# Patient Record
Sex: MALE | Race: WHITE | HISPANIC OR LATINO | Employment: FULL TIME | URBAN - METROPOLITAN AREA
[De-identification: names, ages, dates, MRNs, and addresses within clinical notes are randomized per-mention and may not be internally consistent; named-entity substitution may affect disease eponyms.]

---

## 2020-11-07 ENCOUNTER — HOSPITAL ENCOUNTER (EMERGENCY)
Facility: HOSPITAL | Age: 35
Discharge: HOME | End: 2020-11-07
Attending: EMERGENCY MEDICINE
Payer: OTHER GOVERNMENT

## 2020-11-07 VITALS
TEMPERATURE: 98 F | SYSTOLIC BLOOD PRESSURE: 146 MMHG | HEIGHT: 64 IN | DIASTOLIC BLOOD PRESSURE: 76 MMHG | WEIGHT: 170 LBS | HEART RATE: 103 BPM | RESPIRATION RATE: 20 BRPM | BODY MASS INDEX: 29.02 KG/M2 | OXYGEN SATURATION: 94 %

## 2020-11-07 DIAGNOSIS — R20.0 NUMBNESS: ICD-10-CM

## 2020-11-07 DIAGNOSIS — G62.9 NEUROPATHY: Primary | ICD-10-CM

## 2020-11-07 PROBLEM — R53.1 RIGHT SIDED WEAKNESS: Status: ACTIVE | Noted: 2020-11-07

## 2020-11-07 LAB
ALBUMIN SERPL BCP-MCNC: 4.1 G/DL (ref 3.5–5.2)
ALP SERPL-CCNC: 83 U/L (ref 55–135)
ALT SERPL W/O P-5'-P-CCNC: 262 U/L (ref 10–44)
AMPHET+METHAMPHET UR QL: NEGATIVE
ANION GAP SERPL CALC-SCNC: 15 MMOL/L (ref 8–16)
AST SERPL-CCNC: 482 U/L (ref 10–40)
BARBITURATES UR QL SCN>200 NG/ML: NEGATIVE
BASOPHILS # BLD AUTO: 0.03 K/UL (ref 0–0.2)
BASOPHILS NFR BLD: 0.2 % (ref 0–1.9)
BENZODIAZ UR QL SCN>200 NG/ML: NEGATIVE
BILIRUB SERPL-MCNC: 0.5 MG/DL (ref 0.1–1)
BUN SERPL-MCNC: 24 MG/DL (ref 6–20)
BUN SERPL-MCNC: 26 MG/DL (ref 6–30)
BZE UR QL SCN: NEGATIVE
CALCIUM SERPL-MCNC: 9.5 MG/DL (ref 8.7–10.5)
CANNABINOIDS UR QL SCN: NEGATIVE
CHLORIDE SERPL-SCNC: 100 MMOL/L (ref 95–110)
CHLORIDE SERPL-SCNC: 101 MMOL/L (ref 95–110)
CHOLEST SERPL-MCNC: 185 MG/DL (ref 120–199)
CHOLEST/HDLC SERPL: 4 {RATIO} (ref 2–5)
CO2 SERPL-SCNC: 21 MMOL/L (ref 23–29)
CREAT SERPL-MCNC: 1.5 MG/DL (ref 0.5–1.4)
CREAT SERPL-MCNC: 1.6 MG/DL (ref 0.5–1.4)
CREAT UR-MCNC: 38 MG/DL (ref 23–375)
CTP QC/QA: YES
DIFFERENTIAL METHOD: ABNORMAL
EOSINOPHIL # BLD AUTO: 0 K/UL (ref 0–0.5)
EOSINOPHIL NFR BLD: 0 % (ref 0–8)
ERYTHROCYTE [DISTWIDTH] IN BLOOD BY AUTOMATED COUNT: 13 % (ref 11.5–14.5)
EST. GFR  (AFRICAN AMERICAN): >60 ML/MIN/1.73 M^2
EST. GFR  (NON AFRICAN AMERICAN): 54.2 ML/MIN/1.73 M^2
GLUCOSE SERPL-MCNC: 117 MG/DL (ref 70–110)
GLUCOSE SERPL-MCNC: 158 MG/DL (ref 70–110)
HCT VFR BLD AUTO: 42.3 % (ref 40–54)
HCT VFR BLD CALC: 45 %PCV (ref 36–54)
HDLC SERPL-MCNC: 46 MG/DL (ref 40–75)
HDLC SERPL: 24.9 % (ref 20–50)
HGB BLD-MCNC: 14.1 G/DL (ref 14–18)
IMM GRANULOCYTES # BLD AUTO: 0.18 K/UL (ref 0–0.04)
IMM GRANULOCYTES NFR BLD AUTO: 1 % (ref 0–0.5)
INR PPP: 1 (ref 0.8–1.2)
LDLC SERPL CALC-MCNC: 127.2 MG/DL (ref 63–159)
LYMPHOCYTES # BLD AUTO: 0.8 K/UL (ref 1–4.8)
LYMPHOCYTES NFR BLD: 4.1 % (ref 18–48)
MCH RBC QN AUTO: 29 PG (ref 27–31)
MCHC RBC AUTO-ENTMCNC: 33.3 G/DL (ref 32–36)
MCV RBC AUTO: 87 FL (ref 82–98)
METHADONE UR QL SCN>300 NG/ML: NEGATIVE
MONOCYTES # BLD AUTO: 1 K/UL (ref 0.3–1)
MONOCYTES NFR BLD: 5.4 % (ref 4–15)
NEUTROPHILS # BLD AUTO: 16.8 K/UL (ref 1.8–7.7)
NEUTROPHILS NFR BLD: 89.3 % (ref 38–73)
NONHDLC SERPL-MCNC: 139 MG/DL
NRBC BLD-RTO: 0 /100 WBC
OPIATES UR QL SCN: NEGATIVE
PCP UR QL SCN>25 NG/ML: NEGATIVE
PLATELET # BLD AUTO: 222 K/UL (ref 150–350)
PLATELET BLD QL SMEAR: ABNORMAL
PMV BLD AUTO: 12 FL (ref 9.2–12.9)
POC IONIZED CALCIUM: 1.14 MMOL/L (ref 1.06–1.42)
POC TCO2 (MEASURED): 21 MMOL/L (ref 23–29)
POTASSIUM BLD-SCNC: 4.4 MMOL/L (ref 3.5–5.1)
POTASSIUM SERPL-SCNC: 5.1 MMOL/L (ref 3.5–5.1)
PROT SERPL-MCNC: 8 G/DL (ref 6–8.4)
PROTHROMBIN TIME: 10.7 SEC (ref 9–12.5)
RBC # BLD AUTO: 4.86 M/UL (ref 4.6–6.2)
SAMPLE: ABNORMAL
SARS-COV-2 RDRP RESP QL NAA+PROBE: NEGATIVE
SODIUM BLD-SCNC: 137 MMOL/L (ref 136–145)
SODIUM SERPL-SCNC: 136 MMOL/L (ref 136–145)
T4 FREE SERPL-MCNC: 1.06 NG/DL (ref 0.71–1.51)
TOXICOLOGY INFORMATION: NORMAL
TRIGL SERPL-MCNC: 59 MG/DL (ref 30–150)
TSH SERPL DL<=0.005 MIU/L-ACNC: 0.39 UIU/ML (ref 0.4–4)
WBC # BLD AUTO: 18.84 K/UL (ref 3.9–12.7)

## 2020-11-07 PROCEDURE — 85025 COMPLETE CBC W/AUTO DIFF WBC: CPT

## 2020-11-07 PROCEDURE — 85610 PROTHROMBIN TIME: CPT

## 2020-11-07 PROCEDURE — 80307 DRUG TEST PRSMV CHEM ANLYZR: CPT

## 2020-11-07 PROCEDURE — 25000003 PHARM REV CODE 250: Performed by: EMERGENCY MEDICINE

## 2020-11-07 PROCEDURE — 99284 EMERGENCY DEPT VISIT MOD MDM: CPT | Mod: ,,, | Performed by: PSYCHIATRY & NEUROLOGY

## 2020-11-07 PROCEDURE — 25500020 PHARM REV CODE 255: Performed by: EMERGENCY MEDICINE

## 2020-11-07 PROCEDURE — 99284 PR EMERGENCY DEPT VISIT,LEVEL IV: ICD-10-PCS | Mod: ,,, | Performed by: EMERGENCY MEDICINE

## 2020-11-07 PROCEDURE — 84443 ASSAY THYROID STIM HORMONE: CPT

## 2020-11-07 PROCEDURE — 82565 ASSAY OF CREATININE: CPT

## 2020-11-07 PROCEDURE — 99284 EMERGENCY DEPT VISIT MOD MDM: CPT | Mod: 25

## 2020-11-07 PROCEDURE — 99900035 HC TECH TIME PER 15 MIN (STAT)

## 2020-11-07 PROCEDURE — 84439 ASSAY OF FREE THYROXINE: CPT

## 2020-11-07 PROCEDURE — U0002 COVID-19 LAB TEST NON-CDC: HCPCS | Performed by: EMERGENCY MEDICINE

## 2020-11-07 PROCEDURE — 93005 ELECTROCARDIOGRAM TRACING: CPT

## 2020-11-07 PROCEDURE — 99284 PR EMERGENCY DEPT VISIT,LEVEL IV: ICD-10-PCS | Mod: ,,, | Performed by: PSYCHIATRY & NEUROLOGY

## 2020-11-07 PROCEDURE — 93010 EKG 12-LEAD: ICD-10-PCS | Mod: ,,, | Performed by: INTERNAL MEDICINE

## 2020-11-07 PROCEDURE — 80061 LIPID PANEL: CPT

## 2020-11-07 PROCEDURE — 93010 ELECTROCARDIOGRAM REPORT: CPT | Mod: ,,, | Performed by: INTERNAL MEDICINE

## 2020-11-07 PROCEDURE — 80053 COMPREHEN METABOLIC PANEL: CPT

## 2020-11-07 PROCEDURE — 99284 EMERGENCY DEPT VISIT MOD MDM: CPT | Mod: ,,, | Performed by: EMERGENCY MEDICINE

## 2020-11-07 RX ORDER — SODIUM CHLORIDE 9 MG/ML
INJECTION, SOLUTION INTRAVENOUS CONTINUOUS
Status: DISCONTINUED | OUTPATIENT
Start: 2020-11-07 | End: 2020-11-07

## 2020-11-07 RX ADMIN — SODIUM CHLORIDE 1000 ML: 0.9 INJECTION, SOLUTION INTRAVENOUS at 03:11

## 2020-11-07 RX ADMIN — IOHEXOL 100 ML: 350 INJECTION, SOLUTION INTRAVENOUS at 02:11

## 2020-11-07 NOTE — ED TRIAGE NOTES
Pt reports to ED today w/ complaints of right leg numbness, left facial numbness starting @ 2AM. Pt reports waking up and having difficulty walking. Pt primarily Luxem[place]ish speaking. Pt denies chest pain, SOB.

## 2020-11-07 NOTE — ED PROVIDER NOTES
Encounter Date: 11/7/2020       History     Chief Complaint   Patient presents with    leg numbness     Pt reports leg numbness that 0730     HPI   Samy Bliss is a 35 y.o. male with no past medical history presents with right lower extremity paresthesias and cooler temperature of RLE since 230 am. The patient is Telugu speaking only.  He reports that his symptoms again this morning, and they have persisted, this led him to come in to the emergency room.  He was able to ambulate, but he felt very unsteady.   Review of patient's allergies indicates:  No Known Allergies  No past medical history on file.  No past surgical history on file.  No family history on file.  Social History     Tobacco Use    Smoking status: Not on file   Substance Use Topics    Alcohol use: Not on file    Drug use: Not on file     Review of Systems   Constitutional: Negative for activity change.   HENT: Negative for voice change.    Eyes: Negative for discharge and visual disturbance.   Respiratory: Negative for chest tightness.    Cardiovascular: Negative for chest pain.   Genitourinary: Negative for difficulty urinating.   Musculoskeletal: Negative for back pain.   Neurological: Positive for light-headedness.   Psychiatric/Behavioral: Negative for agitation.       Physical Exam     Initial Vitals   BP Pulse Resp Temp SpO2   11/07/20 1500 11/07/20 1500 11/07/20 1500 11/07/20 1505 11/07/20 1500   119/75 93 19 98.4 °F (36.9 °C) (!) 92 %      MAP       --                Physical Exam    Constitutional: He appears well-developed and well-nourished.   HENT:   Head: Normocephalic.   Eyes: EOM are normal. Pupils are equal, round, and reactive to light.   Neck: Normal range of motion.   Cardiovascular: Normal rate and regular rhythm. Exam reveals no gallop and no friction rub.    No murmur heard.  Pulmonary/Chest: Breath sounds normal. He has no wheezes.   Abdominal: Soft. Bowel sounds are normal.   Musculoskeletal: Normal range of motion.    Neurological: He is alert and oriented to person, place, and time.   Decreased motor on the right lower extrmeity  Diminished sensation RLE   Skin: Skin is warm and dry. Capillary refill takes less than 2 seconds.   superficial abrasion right dorsal wrist  Superficial laceration left cheek next to corner of lip   Psychiatric: He has a normal mood and affect.         ED Course   Procedures  Labs Reviewed   CBC W/ AUTO DIFFERENTIAL - Abnormal; Notable for the following components:       Result Value    WBC 18.84 (*)     Immature Granulocytes 1.0 (*)     Gran # (ANC) 16.8 (*)     Immature Grans (Abs) 0.18 (*)     Lymph # 0.8 (*)     Gran % 89.3 (*)     Lymph % 4.1 (*)     All other components within normal limits   COMPREHENSIVE METABOLIC PANEL - Abnormal; Notable for the following components:    CO2 21 (*)     Glucose 117 (*)     BUN 24 (*)     Creatinine 1.6 (*)      (*)      (*)     eGFR if non  54.2 (*)     All other components within normal limits   TSH - Abnormal; Notable for the following components:    TSH 0.388 (*)     All other components within normal limits   ISTAT PROCEDURE - Abnormal; Notable for the following components:    POC Glucose 158 (*)     POC Creatinine 1.5 (*)     POC TCO2 (MEASURED) 21 (*)     All other components within normal limits   SARS-COV-2 RDRP GENE - Normal    Narrative:     This test utilizes isothermal nucleic acid amplification   technology to detect the SARS-CoV-2 RdRp nucleic acid segment.   The analytical sensitivity (limit of detection) is 125 genome   equivalents/mL.   A POSITIVE result implies infection with the SARS-CoV-2 virus;   the patient is presumed to be contagious.     A NEGATIVE result means that SARS-CoV-2 nucleic acids are not   present above the limit of detection. A NEGATIVE result should be   treated as presumptive. It does not rule out the possibility of   COVID-19 and should not be the sole basis for treatment decisions.   If  COVID-19 is strongly suspected based on clinical and exposure   history, re-testing using an alternate molecular assay should be   considered.   This test is only for use under the Food and Drug   Administration s Emergency Use Authorization (EUA).   Commercial kits are provided by [employer].   Performance characteristics of the EUA have been independently   verified by [facility] Department of   Pathology and Laboratory Medicine.   _________________________________________________________________   The authorized Fact Sheet for Healthcare Providers and the authorized Fact   Sheet for Patients of the ID NOW COVID-19 are available on the FDA   website:     https://www.fda.gov/media/218003/download  https://www.fda.gov/media/281563/download       PROTIME-INR   LIPID PANEL   DRUG SCREEN PANEL, URINE EMERGENCY    Narrative:     Specimen Source->Urine   T4, FREE        ECG Results          ECG 12 lead (Final result)  Result time 11/08/20 09:13:40    Final result by Interface, [facility] (11/08/20 09:13:40)                 Narrative:    Test Reason : I63.9,    Vent. Rate : 094 BPM     Atrial Rate : 094 BPM     P-R Int : 134 ms          QRS Dur : 094 ms      QT Int : 358 ms       P-R-T Axes : 037 030 022 degrees     QTc Int : 447 ms    Normal sinus rhythm  Minimal voltage criteria for LVH, may be normal variant  Borderline Abnormal ECG  No previous ECGs available  Confirmed by Horacio Goff MD ([id number]) on 11/8/2020 9:13:29 AM    Referred By: [email or web address]   SELF           Confirmed By:Horacio Goff MD                            Imaging Results          MRA Neck without contrast (Final result)  Result time 11/07/20 14:58:14    Final result by Ravin Berg MD (11/07/20 14:58:14)                 Impression:      1. Less than 50% stenosis at the carotid bulbs and proximal internal carotid arteries.  No findings of hemodynamically significant stenosis.  2. Time-of-flight imaging with antegrade flow  related signal in the bilateral carotid and vertebral arteries.      Electronically signed by: Ravin Berg  Date:    11/07/2020  Time:    14:58             Narrative:    EXAMINATION:  MRA NECK WITHOUT CONTRAST    CLINICAL HISTORY:  Stroke, follow up;    TECHNIQUE:  Non-contrast 2D and/or 3D time-of-flight MR angiography of the neck was performed, with MIP reformatting.    COMPARISON:  Same-day MRI brain and MRA head.    FINDINGS:  Imaged aortic arch: Cardiac arch limited evaluation due to motion and lack of intravenous contrast.  No evidence of flow-limiting stenosis at the great vessel origins.    Right common and cervical internal carotid arteries: Less than 50% stenosis at the carotid bulb and proximal ICA.    Left common and cervical internal carotid arteries: Less than 50% stenosis at the carotid bulb and proximal ICA.    Right cervical vertebral artery: There is no hemodynamically significant stenosis or dissection    Left cervical vertebral artery: There is no hemodynamically significant stenosis or dissection.                               MRI Brain Ischemic Inter Pro Incl MRA W/O Con (Final result)  Result time 11/07/20 14:54:25    Final result by Ravin Berg MD (11/07/20 14:54:25)                 Impression:      MRI brain:    No acute intracranial findings.  Specifically, no evidence of acute ischemia or recent infarction, as question.    MRA head:    1. No evidence of acute large vessel occlusion or flow-limiting stenosis.  2. Mild presumed atheromatous narrowing of the internal carotid arteries.  3. While study is motion compromised, there is suggestion of an inferiorly directed saccular outpouching measuring approximately 2 mm at the left MCA trifurcation concerning for aneurysm.  This appears to be at the origin of a superior division M2 branch.  4. Additionally, there is suggestion of a 2 mm outpouching at the proximal right A2 FILIBERTO which is favored to represent prominent vessel tortuosity,  however attention on follow-up is recommended to exclude an additional aneurysm.  5. Additional details as per report body.      Electronically signed by: Ravin Berg  Date:    11/07/2020  Time:    14:54             Narrative:    EXAMINATION:  MRI BRAIN ISCHEMIC INTERVENTIONAL PROTOCOL INCL MRA W/O CONTRAST    CLINICAL HISTORY:  Stroke, follow up;    TECHNIQUE:  Multiplanar, multisequence MRI imaging of the brain was performed without contrast.  Time-of-flight MRA of the head was performed.  Multiplanar reformats and MIP reconstructions were performed of the MRA images.    COMPARISON:  CT head performed 11/07/2020    FINDINGS:  Brain MRI:    Parenchyma: There is no restricted diffusion to suggest acute or subacute ischemic infarct.Brain parenchyma is considered overall to be within normal limits for patient age.    Additional comments: There is no midline shift, abnormal extra-axial fluid collection, or acute intracranial hemorrhage. The basal cisterns are patent.    Ventricles: Normal.    Flow voids: The normal major intracranial arterial flow voids are visualized.    Midline structures: The pituitary and craniocervical junction are normal.    Marrow: Normal    MRA head:    Right anterior circulation:Mild atheromatous narrowing of the intracranial ICA.  Right ophthalmic artery is patent.An apparent 2 mm anteriorly and right laterally directed outpouching at the proximal right A2 FILIBERTO (series 17, image 99) is felt to be a on the basis of vessel tortuosity rather than a true saccular outpouching.  However, attention on follow-up would be recommended.    Left anterior circulation: Circulation mild atheromatous narrowing of the intracranial ICA.Left ophthalmic artery is patent.There is suggestion of an inferiorly directed 2 mm saccular outpouching at the left MCA trifurcation, which appears to involve an M2 superior division branch origin.    Posterior circulation: There is no hemodynamically significant  vertebrobasilar stenosis. There is no significant PCA stenosis. Posterior inferior cerebellar arteries patent at origin.    Anterior and posterior communicating arteries: The anterior and left posterior communicating arteries are visualized. A right posterior communicating artery is reliably visualized.                               CTA Chest Abdomen Pelvis (Final result)  Result time 11/07/20 15:11:34    Final result by Beau Roberts MD (11/07/20 15:11:34)                 Impression:      No evidence of an aortic aneurysm or dissection as clinically questioned.    Lungs demonstrate patchy ground-glass opacities bilaterally, which are nonspecific and can be seen with inflammatory or infectious pneumonia (such as viral or atypical bacterial).    Mildly increased soft tissue within the anterior mediastinum, favored to reflect a thymic remnant.    Electronically signed by resident: Jazmyne Ramsay  Date:    11/07/2020  Time:    14:24    Electronically signed by: Beau Roberts MD  Date:    11/07/2020  Time:    15:11             Narrative:    EXAMINATION:  CTA CHEST ABDOMEN PELVIS    CLINICAL HISTORY:  Thoracic aortic aneurysm suspected, initial exam;    TECHNIQUE:  Initial noncontrast  images of the chest were obtained obtained.  Using 100 cc of Omnipaque 350 IV contrast material, and multi-detector helical CT technique, axial CT angiogram images of the chest, abdomen and pelvis were obtained with delayed images obtained of the abdomen and pelvis only.  Sagittal and coronal reformats were created.    Examination . degraded by patient motion artifact.    COMPARISON:  None    FINDINGS:  Thoracic soft tissues: No significant abnormality.    Heart: No cardiomegaly or pericardial effusion.    Estela/Mediastinum: Increased soft tissue and fat within the anterior mediastinum, possibly reflecting a thymic remnant.  No pathologically enlarged lymph nodes.  There are a few partially calcified mediastinal and left hilar  lymph nodes, favored to reflect sequela of remote granulomatous disease.    Lungs: Patchy ground-glass opacities throughout both lungs, right greater than left.  No consolidation or pleural effusion.  No pneumothorax.    Liver: Normal in size and attenuation without focal hepatic abnormality.    Gallbladder: Normal in appearance without evidence for cholecystitis.    Bile Ducts: No intra or extrahepatic biliary ductal dilation.    Pancreas: No pancreatic mass lesion or peripancreatic inflammatory change.    Spleen: Unremarkable.    Adrenals: Unremarkable.    Kidneys/ Ureters: Normal in size and location.  Normal concentration and excretion of contrast. No focal renal abnormality or nephrolithiasis.  No hydroureteronephrosis.    Bladder: Smooth contours without bladder wall thickening.    Reproductive organs: Unremarkable.    GI Tract/Mesentery: Stomach is normal appearance.  Visualized loops of small and large bowel are normal in caliber without evidence for obstruction or inflammation. Appendix is visualized and is unremarkable.    Peritoneal Space: No abdominopelvic ascites or intraperitoneal free air.    Retroperitoneum: No significant adenopathy.    Abdominal wall: Unremarkable.    Bones: Minimal degenerative change without acute fracture or bone destructive process.    Vasculature: Left-sided 3 vessel aortic arch.  The thoracic and abdominal aorta are normal in caliber without evidence of dissection or aneurysm.  No significant atherosclerosis.                               CT Head Without Contrast (Final result)  Result time 11/07/20 14:21:14    Final result by Beau Roberts MD (11/07/20 14:21:14)                 Impression:      Right pontine suspected lacunar type infarct, age indeterminate.  Correlate clinically.    Otherwise, no acute large vascular territory infarct or intracranial hemorrhage identified.  Further evaluation/follow-up as warranted.      Electronically signed by: Beau Roberts  MD  Date:    11/07/2020  Time:    14:21             Narrative:    EXAMINATION:  CT HEAD WITHOUT CONTRAST    CLINICAL HISTORY:  Neuro deficit, acute, stroke suspected;    TECHNIQUE:  Low dose axial CT images obtained throughout the head without intravenous contrast. Sagittal and coronal reconstructions were performed.    COMPARISON:  None.    FINDINGS:  Intracranial compartment: Brain appears normally formed.    Ventricles and sulci are normal in size for age without evidence of hydrocephalus. No extra-axial blood or fluid collections.    Subtle area of hypoattenuation at the right aspect of the jos concerning for lacunar type infarct, age indeterminate.  No parenchymal mass, hemorrhage, edema or major vascular distribution infarct.    Skull/extracranial contents (limited evaluation): No fracture. Mastoid air cells and paranasal sinuses are essentially clear.  Imaged portions of the orbits are within normal limits.                              X-Rays:   Independently Interpreted Readings:   Head CT: No hemorrhage.     Medical Decision Making:   Initial Assessment:   Samy Bliss is a 35 y.o. male with RLE paresthesias  Clinical Tests:   Radiological Study: Ordered and Reviewed       APC / Resident Notes:   CT head/C/A/P ordered to assess for CVA/Dissection  Will fu results. Neuro team evaluating patient as well, they recommend MRI brain.    Patient re-evaluated at 2:50 p.m. the patient is now complaining of right dorsal wrist numbness.  This is new from his admission.  All of his images up to this point have come back negative.  Once again, he endorses full sensation to the thigh, however diminished sensation to the distal right lower extremity.     3:29 p.m.:  CBC shows preliminary white count 18.  At this point, the picture is unclear, we will obtain a urine drug screen    4:19 p.m.:  Urine drug screen within normal limits.  His CMP did show elevation in liver enzymes.  His CT scan shows lung field opacities.  Will order covid swab.    4:51 pm. COVID swab still pending. Will continue to follow    4:57 p.m.:  COVID test was negative.  Will discuss plan with staff    5:27 p.m.:  Had a lengthy discussion with the patient with the  regarding the plan.  The patient reports that his symptoms have remained relatively unchanged during his stay here in the emergency room.  His workup thus far has been inconclusive.  Instructed the patient that if his symptoms were to worsen, or if he develops more severe symptoms such as fevers, chills, chest pain shortness of breath, to return to the hospital immediately.  Patient reports verbal understanding this is the .  I also gave him a handout of about 25 local healthcare areas were he can establish care with a primary care physician.  The patient reports understanding of this and accepted the paperwork. All questions were answered to patient's satisfaction.              Attending Attestation:   Physician Attestation Statement for Resident:  As the supervising MD   Physician Attestation Statement: I have personally seen and examined this patient.   I agree with the above history. -:   As the supervising MD I agree with the above PE.    As the supervising MD I agree with the above treatment, course, plan, and disposition.                              Clinical Impression:       ICD-10-CM ICD-9-CM   1. Neuropathy  G62.9 355.9   2. Numbness  R20.0 782.0                          ED Disposition Condition    Discharge Stable        ED Prescriptions     None        Follow-up Information    None                                      Alvin Bell MD  Resident  11/07/20 [id number]       Martha Wick MD  11/08/20 [id number]

## 2020-11-07 NOTE — CONSULTS
[facility]  Vascular Neurology  Comprehensive Stroke Center  Consult Note    Inpatient consult to Vascular (Stroke) Neurology  Consult performed by: Cory Redd MD  Consult ordered by: Martha Wick MD        Assessment/Plan:     Patient is a 35 y.o. year old male with:    * Right sided numbness  36 y/o M evaluated for stroke by Vascular Neurology due to acute onset paraesthesias on no significant past medical history. NIHSS 1. Stroke workup not concerning for acute intracranial process. No acute interventions or further stroke workup recommended at this time.     Defer further management to ED  Vascular Neurology sign off    Left facial numbness  See right sided numbness        STROKE DOCUMENTATION          NIH Scale:  1a. Level of Consciousness: 0-->Alert, keenly responsive  1b. LOC Questions: 0-->Answers both questions correctly  1c. LOC Commands: 0-->Performs both tasks correctly  2. Best Gaze: 0-->Normal  3. Visual: 0-->No visual loss  4. Facial Palsy: 0-->Normal symmetrical movements  5a. Motor Arm, Left: 0-->No drift, limb holds 90 (or 45) degrees for full 10 secs  5b. Motor Arm, Right: 0-->No drift, limb holds 90 (or 45) degrees for full 10 secs  6a. Motor Leg, Left: 0-->No drift, leg holds 30 degree position for full 5 secs  6b. Motor Leg, Right: 0-->No drift, leg holds 30 degree position for full 5 secs  7. Limb Ataxia: 0-->Absent  8. Sensory: 1-->Mild-to-moderate sensory loss, patient feels pinprick is less sharp or is dull on the affected side, or there is a loss of superficial pain with pinprick, but patient is aware of being touched  9. Best Language: 0-->No aphasia, normal  10. Dysarthria: 0-->Normal  11. Extinction and Inattention (formerly Neglect): 0-->No abnormality  Total (NIH Stroke Scale): 1    Modified [place]    Aurelio Coma Scale:15   ABCD2 Score:    [id number] Score:   HAS -BLED Score:   ICH Score:   Hunt & D[place] Classification:       Thrombolysis Candidate?  No, Strong suspicion for stroke mimic or alternative diagnosis     Delays to Thrombolysis?  No    Interventional Revascularization Candidate?   Is the patient eligible for mechanical endovascular reperfusion ([facility]ER)?  N/A      Hemorrhagic change of an Ischemic Stroke: Does this patient have an ischemic stroke with hemorrhagic changes? No     Subjective:     History of Present Illness:  34 y/o Cym[place] speaking M presents with acute onset right sided weakness with concomitant paresthesias and gait instability on no significant past medical history. Vascular neurology called for evaluation. Information was obtained via EMR and language line .     Patient states noticing right upper/lower extremity paresthesias and left facial numbness. Symptoms started today at 0230 today. Furthermore patient endorses cooler temperature of RLE and difficulty ambulating. Patient describes feeling unsteady because right lower extremity feels weak. Denies chest pain, SOB, headache, N/V, changes in bowel or bladder habits.                No past medical history on file.  No past surgical history on file.  No family history on file.  Social History     Tobacco Use    Smoking status: Not on file   Substance Use Topics    Alcohol use: Not on file    Drug use: Not on file     Review of patient's allergies indicates:  No Known Allergies    Medications: I have reviewed the current medication administration record.    (Not in a hospital admission)      Review of Systems   Constitutional: Negative for fever.   HENT: Negative for facial swelling.    Respiratory: Negative for chest tightness and shortness of breath.    Cardiovascular: Negative for chest pain.   Gastrointestinal: Negative for abdominal pain.   Genitourinary: Negative for difficulty urinating.   Musculoskeletal: Positive for gait problem.   Skin: Positive for wound.        New scratches on left side of face as well as chest and right hand.    Neurological: Positive for  numbness. Negative for weakness and headaches.   Psychiatric/Behavioral: Negative for agitation and confusion.     Objective:     Vital Signs (Most Recent):       Vital Signs Range (Last 24H):       Physical Exam  Vitals signs reviewed.   Constitutional:       General: He is not in acute distress.     Appearance: Normal appearance. He is normal weight. He is not ill-appearing, toxic-appearing or diaphoretic.   HENT:      Head: Normocephalic.      Comments: Scratches noted on left cheek     Right Ear: External ear normal.      Left Ear: External ear normal.      Nose: Nose normal.      Mouth/Throat:      Mouth: Mucous membranes are moist.   Eyes:      Extraocular Movements: Extraocular movements intact.      Conjunctiva/sclera: Conjunctivae normal.      Pupils: Pupils are equal, round, and reactive to light.   Neck:      Musculoskeletal: Normal range of motion.   Pulmonary:      Effort: Pulmonary effort is normal. No respiratory distress.   Abdominal:      General: There is no distension.   Musculoskeletal: Normal range of motion.         General: No swelling, tenderness or deformity.      Right lower leg: No edema.      Left lower leg: No edema.   Skin:     General: Skin is warm and dry.      Coloration: Skin is not jaundiced.      Findings: Lesion present.      Comments: Scratches on chest and right hand   Neurological:      General: No focal deficit present.      Mental Status: He is alert and oriented to person, place, and time.      Cranial Nerves: No cranial nerve deficit.      Sensory: Sensory deficit present.      Motor: No weakness.      Gait: Gait abnormal.   Psychiatric:         Mood and Affect: Mood normal.         Behavior: Behavior normal.         Neurological Exam:   LOC: alert  Attention Span: Good   Language: No aphasia  Articulation: No dysarthria  Orientation: Person, Place, Time   Visual Fields: Full  EOM (CN III, IV, VI): Full/intact  Pupils (CN II, III): PERRL  Facial Sensation (CN V): Facial  sensory loss  Facial Movement (CN VII): Symmetric facial expression    Motor: Arm left  Normal 5/5  Leg left  Normal 5/5  Arm right  Normal 5/5  Leg right Normal 5/5  Sensation: Henri-hypoesthesia right      Laboratory:  CMP: No results for input(s): GLUCOSE, CALCIUM, ALBUMIN, PROT, NA, K, CO2, CL, BUN, CREATININE, ALKPHOS, ALT, AST, BILITOT in the last 24 hours.  CBC:   Recent Labs   Lab 11/07/20  1349   HCT 45     Lipid Panel: No results for input(s): CHOL, LDLCALC, HDL, TRIG in the last 168 hours.  Coagulation: No results for input(s): PT, INR, APTT in the last 168 hours.  Hgb A1C: No results for input(s): HGBA1C in the last 168 hours.  TSH: No results for input(s): TSH in the last 168 hours.    Diagnostic Results:      Brain imaging:  CT HEAD WITHOUT CONTRAST (11/07/2020)  Right pontine suspected lacunar type infarct, age indeterminate.  Correlate clinically.  Otherwise, no acute large vascular territory infarct or intracranial hemorrhage identified. Further evaluation/follow-up as warranted.    MRI BRAIN ISCHEMIC INTERVENTIONAL PROTOCOL INCL MRA W/O CONTRAST (11/07/2020)  1. No evidence of acute large vessel occlusion or flow-limiting stenosis.  2. Mild presumed atheromatous narrowing of the internal carotid arteries.  3. While study is motion compromised, there is suggestion of an inferiorly directed saccular outpouching measuring approximately 2 mm at the left MCA trifurcation concerning for aneurysm.  This appears to be at the origin of a superior division M2 branch.  4. Additionally, there is suggestion of a 2 mm outpouching at the proximal right A2 FILIBERTO which is favored to represent prominent vessel tortuosity, however attention on follow-up is recommended to exclude an additional aneurysm.  5. Additional details as per report body.      Vessel Imaging:  MRA NECK WITHOUT CONTRAST (11/07/2020)  1. Less than 50% stenosis at the carotid bulbs and proximal internal carotid arteries.  No findings of  hemodynamically significant stenosis.  2. Time-of-flight imaging with antegrade flow related signal in the bilateral carotid and vertebral arteries.    Cardiac Evaluation:   marilee Redd MD  Comprehensive Stroke Center  Department of Vascular Neurology   [facility]

## 2020-11-07 NOTE — SUBJECTIVE & OBJECTIVE
No past medical history on file.  No past surgical history on file.  No family history on file.  Social History     Tobacco Use    Smoking status: Not on file   Substance Use Topics    Alcohol use: Not on file    Drug use: Not on file     Review of patient's allergies indicates:  No Known Allergies    Medications: I have reviewed the current medication administration record.    (Not in a hospital admission)      Review of Systems   Constitutional: Negative for fever.   HENT: Negative for facial swelling.    Respiratory: Negative for chest tightness and shortness of breath.    Cardiovascular: Negative for chest pain.   Gastrointestinal: Negative for abdominal pain.   Genitourinary: Negative for difficulty urinating.   Musculoskeletal: Positive for gait problem.   Skin: Positive for wound.        New scratches on left side of face as well as chest and right hand.    Neurological: Positive for numbness. Negative for weakness and headaches.   Psychiatric/Behavioral: Negative for agitation and confusion.     Objective:     Vital Signs (Most Recent):       Vital Signs Range (Last 24H):       Physical Exam  Vitals signs reviewed.   Constitutional:       General: He is not in acute distress.     Appearance: Normal appearance. He is normal weight. He is not ill-appearing, toxic-appearing or diaphoretic.   HENT:      Head: Normocephalic.      Comments: Scratches noted on left cheek     Right Ear: External ear normal.      Left Ear: External ear normal.      Nose: Nose normal.      Mouth/Throat:      Mouth: Mucous membranes are moist.   Eyes:      Extraocular Movements: Extraocular movements intact.      Conjunctiva/sclera: Conjunctivae normal.      Pupils: Pupils are equal, round, and reactive to light.   Neck:      Musculoskeletal: Normal range of motion.   Pulmonary:      Effort: Pulmonary effort is normal. No respiratory distress.   Abdominal:      General: There is no distension.   Musculoskeletal: Normal range of  motion.         General: No swelling, tenderness or deformity.      Right lower leg: No edema.      Left lower leg: No edema.   Skin:     General: Skin is warm and dry.      Coloration: Skin is not jaundiced.      Findings: Lesion present.      Comments: Scratches on chest and right hand   Neurological:      General: No focal deficit present.      Mental Status: He is alert and oriented to person, place, and time.      Cranial Nerves: No cranial nerve deficit.      Sensory: Sensory deficit present.      Motor: No weakness.      Gait: Gait abnormal.   Psychiatric:         Mood and Affect: Mood normal.         Behavior: Behavior normal.         Neurological Exam:   LOC: alert  Attention Span: Good   Language: No aphasia  Articulation: No dysarthria  Orientation: Person, Place, Time   Visual Fields: Full  EOM (CN III, IV, VI): Full/intact  Pupils (CN II, III): PERRL  Facial Sensation (CN V): Facial sensory loss  Facial Movement (CN VII): Symmetric facial expression    Motor: Arm left  Normal 5/5  Leg left  Normal 5/5  Arm right  Normal 5/5  Leg right Normal 5/5  Sensation: Henri-hypoesthesia right      Laboratory:  CMP: No results for input(s): GLUCOSE, CALCIUM, ALBUMIN, PROT, NA, K, CO2, CL, BUN, CREATININE, ALKPHOS, ALT, AST, BILITOT in the last 24 hours.  CBC:   Recent Labs   Lab 11/07/20  1349   HCT 45     Lipid Panel: No results for input(s): CHOL, LDLCALC, HDL, TRIG in the last 168 hours.  Coagulation: No results for input(s): PT, INR, APTT in the last 168 hours.  Hgb A1C: No results for input(s): HGBA1C in the last 168 hours.  TSH: No results for input(s): TSH in the last 168 hours.    Diagnostic Results:      Brain imaging:  CT HEAD WITHOUT CONTRAST (11/07/2020)  Right pontine suspected lacunar type infarct, age indeterminate.  Correlate clinically.  Otherwise, no acute large vascular territory infarct or intracranial hemorrhage identified. Further evaluation/follow-up as warranted.    MRI BRAIN ISCHEMIC  INTERVENTIONAL PROTOCOL INCL MRA W/O CONTRAST (11/07/2020)  1. No evidence of acute large vessel occlusion or flow-limiting stenosis.  2. Mild presumed atheromatous narrowing of the internal carotid arteries.  3. While study is motion compromised, there is suggestion of an inferiorly directed saccular outpouching measuring approximately 2 mm at the left MCA trifurcation concerning for aneurysm.  This appears to be at the origin of a superior division M2 branch.  4. Additionally, there is suggestion of a 2 mm outpouching at the proximal right A2 FILIBERTO which is favored to represent prominent vessel tortuosity, however attention on follow-up is recommended to exclude an additional aneurysm.  5. Additional details as per report body.      Vessel Imaging:  MRA NECK WITHOUT CONTRAST (11/07/2020)  1. Less than 50% stenosis at the carotid bulbs and proximal internal carotid arteries.  No findings of hemodynamically significant stenosis.  2. Time-of-flight imaging with antegrade flow related signal in the bilateral carotid and vertebral arteries.    Cardiac Evaluation:   nil

## 2020-11-07 NOTE — ASSESSMENT & PLAN NOTE
34 y/o M evaluated for stroke by Vascular Neurology due to acute onset paraesthesias on no significant past medical history. NIHSS 1. Stroke workup not concerning for acute intracranial process. No acute interventions or further stroke workup recommended at this time.     Defer further management to ED  Vascular Neurology sign off

## 2020-11-07 NOTE — HPI
34 y/o Swazi speaking M presents with acute onset right sided weakness with concomitant paresthesias and gait instability on no significant past medical history. Vascular neurology called for evaluation. Information was obtained via EMR and language line .     Patient states noticing right upper/lower extremity paresthesias and left facial numbness. Symptoms started today at 0230 today. Furthermore patient endorses cooler temperature of RLE and difficulty ambulating. Patient describes feeling unsteady because right lower extremity feels weak. Denies chest pain, SOB, headache, N/V, changes in bowel or bladder habits.

## 2020-11-09 LAB
CREAT SERPL-MCNC: 1.6 MG/DL (ref 0.5–1.4)
POC PTINR: 1 (ref 0.9–1.2)
POC PTWBT: 11.5 SEC (ref 9.7–14.3)
SAMPLE: ABNORMAL
SAMPLE: NORMAL